# Patient Record
Sex: FEMALE | Race: WHITE | NOT HISPANIC OR LATINO | Employment: PART TIME | URBAN - METROPOLITAN AREA
[De-identification: names, ages, dates, MRNs, and addresses within clinical notes are randomized per-mention and may not be internally consistent; named-entity substitution may affect disease eponyms.]

---

## 2018-01-25 ENCOUNTER — TELEPHONE (OUTPATIENT)
Dept: URGENT CARE | Facility: CLINIC | Age: 53
End: 2018-01-25

## 2018-01-25 ENCOUNTER — OFFICE VISIT (OUTPATIENT)
Dept: ORTHOPEDIC SURGERY | Facility: CLINIC | Age: 53
End: 2018-01-25

## 2018-01-25 VITALS
DIASTOLIC BLOOD PRESSURE: 78 MMHG | BODY MASS INDEX: 28.31 KG/M2 | WEIGHT: 176.15 LBS | HEART RATE: 87 BPM | HEIGHT: 66 IN | SYSTOLIC BLOOD PRESSURE: 125 MMHG

## 2018-01-25 DIAGNOSIS — M89.8X1 PAIN OF RIGHT SCAPULA: Primary | ICD-10-CM

## 2018-01-25 DIAGNOSIS — M54.2 NECK PAIN: ICD-10-CM

## 2018-01-25 DIAGNOSIS — M25.511 RIGHT SHOULDER PAIN, UNSPECIFIED CHRONICITY: ICD-10-CM

## 2018-01-25 PROCEDURE — 99203 OFFICE O/P NEW LOW 30 MIN: CPT | Performed by: PHYSICIAN ASSISTANT

## 2018-01-25 RX ORDER — LISDEXAMFETAMINE DIMESYLATE 70 MG/1
CAPSULE ORAL
Refills: 0 | COMMUNITY
Start: 2017-11-10

## 2018-01-25 NOTE — PROGRESS NOTES
[facility] [place] [facility] Note    Subjective     Pain of the Right Shoulder      HPI    Heather Tipton is a 52 y.o. female. Patient is right hand dominant. Her referral was for right shoulder pain however she localizes the pain the the right medial scapula border with intermittent neck stiffness. No specific pain to the shoulder joint itself. She noted similar pain in October 2017 believed to be caused by an extended drive and overuse. That episode took 2-3 weeks to resolve without any specific treatment. This new episode began 6 days ago. No specific injury or trauma; although, her job requires sitting a computer. The pain is described as consistent, persistent, aching and stabbing. She has found no position that relieves the pain. She recently was evaluated at a [facility] and prescribed naproxen (taking) and metaxalone (not filled). She also reports taking Advil and acetaminophen intermittently. She denies fever, chills or night sweats. She reports numbness and tingling into her right hand/all digits, except for the small finger. No reported loss of strength or function. She is here today with her .    Past Medical History:   Diagnosis Date   • ADHD (attention deficit hyperactivity disorder)       History reviewed. No pertinent surgical history.   Family History   Problem Relation Age of Onset   • Cancer Father    • Heart attack Father      Social History     Social History   • Marital status:      Spouse name: N/A   • Number of children: N/A   • Years of education: N/A     Occupational History   • Not on file.     Social History Main Topics   • Smoking status: Former Smoker     Types: Cigarettes   • Smokeless tobacco: Never Used   • Alcohol use Defer   • Drug use: Defer   • Sexual activity: Defer     Other Topics Concern   • Not on file     Social History Narrative      Current Outpatient Prescriptions on File Prior to Visit   Medication Sig Dispense Refill   • clindamycin (CLEOCIN) 300 MG  "capsule Take 1 capsule by mouth 3 (three) times a day. 30 capsule 0   • metaxalone (SKELAXIN) 800 MG tablet Take 1 tablet by mouth 3 (Three) Times a Day As Needed for Muscle Spasms for up to 21 days. 21 tablet 0   • naproxen (NAPROSYN) 500 MG tablet Take 1 tablet by mouth 2 (Two) Times a Day With Meals for 7 days. 14 tablet 0     No current facility-administered medications on file prior to visit.       No Known Allergies     The following portions of the patient's history were reviewed and updated as appropriate: allergies, current medications, past family history, past medical history, past social history, past surgical history and problem list.    Review of Systems   Constitutional: Negative.    HENT: Negative.    Eyes: Negative.    Respiratory: Negative.    Cardiovascular: Negative.    Gastrointestinal: Negative.    Endocrine: Negative.    Genitourinary: Negative.    Musculoskeletal: Positive for arthralgias and neck stiffness.   Skin: Negative.    Allergic/Immunologic: Negative.    Neurological: Negative.    Hematological: Negative.    Psychiatric/Behavioral: Negative.         Objective      Physical Exam  /78  Pulse 87  Ht 168 cm (66.14\")  Wt 79.9 kg (176 lb 2.4 oz)  BMI 28.31 kg/m2    Body mass index is 28.31 kg/(m^2).    General  Mental Status - alert  General Appearance - cooperative, well groomed, not in acute distress  Orientation - Oriented X3  Build & Nutrition - well developed and well nourished  Posture - normal posture  Gait - normal gait     Integumentary  Global Assessment  Examination of related systems reveals - no lymphadenopathy  General Characteristics  Overall examination of the patient's skin reveals - no rashes, no evidence of scars, no suspicious lesions and no bruises.  Color - normal coloration of skin.    Ortho Exam  C-spine: skin intact without redness, warmth, swelling, lesions. No tenderness along spinous processes. Full cervical motion. Spurling's test with pain into right " arm.     Right Shoulder  Skin: intact without redness, warmth or swelling  TTP: Only noted upper medial scapular border, no spasm/knots/massess noted. None throughout shoulder complex.  ROM: Full motion throughout shoulder with pulling sensation/pain medial border scapula.   Impingement: Neer and Hampton negative  Rotator cuff: Empty can, Drop arm, Lift-off/modified lift-off negative.  Labrum: Gay negative  Bicep: Speeds negative  ACJ: Adduction cross body negative  Strength: 5/5 cuff    Right Elbow/ wrist  Skin negative. FROM. Elbow flexors/extensors 5/5. Wrist flexors/extensors 5/5. Intrinsics 5/5.    Motor: intact Ax/MSC/R/U/M/AIN/PIN  Sensory: intact Ax/MSC/R/U/M  Reflexes: Triceps/Biceps/BR 2+    2+ radial pulse with brisk capillary refill into each digit    Left Shoulder  Skin: normal  No tenderness to palpation  FROM    Imaging/Studies  Plain imaging today of C-spine (3view) and shoulder (3 view) show no acute bony abnormality, fracture, dislocation or lesion. Imaging discussed and reviewed with Dr. Terrell.    Assessment:  1. Pain of right scapula    2. Neck pain    3. Right shoulder pain, unspecified chronicity        Plan:  1. Discussion was had with patient and her  regarding exam, assessment and treatment options. At this time I recommend PT and Pain Management. This could be an inflamed trigger point however with the described radicular symptoms further evaluation of her c-spine may be needed if PT and/or Pain Management are of no benefit.  2. Medication education was provided regarding NSAID usage and related side effects.  3. Recommend proceeding with filling muscle relaxant prescription.  4. Discussed home treatments: ice, heat, stretching and possible massage.  5. F/u with orthopedic surgery clinic as needed.  6. Patient denies having a Primary Care Provider, recommended that she establish on to assist with medical management of any ongoing issues.      Medical Decision Making  Management  Options : prescription/IM medicine and physical/occupational therapy  Data/Risk: radiology tests    Antonina Hardy PA-C  01/25/18  12:42 PM

## 2021-03-29 ENCOUNTER — IMMUNIZATION (OUTPATIENT)
Dept: VACCINE CLINIC | Facility: HOSPITAL | Age: 56
End: 2021-03-29

## 2021-03-29 PROCEDURE — 0001A: CPT | Performed by: INTERNAL MEDICINE

## 2021-03-29 PROCEDURE — 91300 HC SARSCOV02 VAC 30MCG/0.3ML IM: CPT | Performed by: INTERNAL MEDICINE

## 2021-04-19 ENCOUNTER — IMMUNIZATION (OUTPATIENT)
Dept: VACCINE CLINIC | Facility: HOSPITAL | Age: 56
End: 2021-04-19

## 2021-04-19 PROCEDURE — 0002A: CPT | Performed by: INTERNAL MEDICINE

## 2021-04-19 PROCEDURE — 91300 HC SARSCOV02 VAC 30MCG/0.3ML IM: CPT | Performed by: INTERNAL MEDICINE

## 2022-05-16 RX ORDER — SODIUM, POTASSIUM,MAG SULFATES 17.5-3.13G
SOLUTION, RECONSTITUTED, ORAL ORAL
Qty: 354 ML | Refills: 0 | Status: SHIPPED | OUTPATIENT
Start: 2022-05-16

## 2022-06-01 ENCOUNTER — OUTSIDE FACILITY SERVICE (OUTPATIENT)
Dept: GASTROENTEROLOGY | Facility: CLINIC | Age: 57
End: 2022-06-01

## 2022-06-01 PROCEDURE — 45385 COLONOSCOPY W/LESION REMOVAL: CPT | Performed by: INTERNAL MEDICINE

## 2022-06-01 PROCEDURE — 88305 TISSUE EXAM BY PATHOLOGIST: CPT | Performed by: INTERNAL MEDICINE

## 2022-06-02 ENCOUNTER — LAB REQUISITION (OUTPATIENT)
Dept: LAB | Facility: HOSPITAL | Age: 57
End: 2022-06-02

## 2022-06-02 DIAGNOSIS — Z83.71 FAMILY HISTORY OF COLONIC POLYPS: ICD-10-CM

## 2022-06-02 DIAGNOSIS — Z12.11 ENCOUNTER FOR SCREENING FOR MALIGNANT NEOPLASM OF COLON: ICD-10-CM

## 2022-06-02 DIAGNOSIS — Z80.0 FAMILY HISTORY OF MALIGNANT NEOPLASM OF DIGESTIVE ORGANS: ICD-10-CM

## 2022-06-02 DIAGNOSIS — D12.5 BENIGN NEOPLASM OF SIGMOID COLON: ICD-10-CM

## 2022-06-02 DIAGNOSIS — D12.8 BENIGN NEOPLASM OF RECTUM: ICD-10-CM

## 2022-06-02 DIAGNOSIS — K57.30 DIVERTICULOSIS OF LARGE INTESTINE WITHOUT PERFORATION OR ABSCESS WITHOUT BLEEDING: ICD-10-CM

## 2022-06-02 DIAGNOSIS — D12.3 BENIGN NEOPLASM OF TRANSVERSE COLON: ICD-10-CM

## 2022-06-03 LAB
CYTO UR: NORMAL
LAB AP CASE REPORT: NORMAL
LAB AP CLINICAL INFORMATION: NORMAL
PATH REPORT.FINAL DX SPEC: NORMAL
PATH REPORT.GROSS SPEC: NORMAL

## 2025-05-21 RX ORDER — SODIUM, POTASSIUM,MAG SULFATES 17.5-3.13G
SOLUTION, RECONSTITUTED, ORAL ORAL
Qty: 354 ML | Refills: 0 | Status: SHIPPED | OUTPATIENT
Start: 2025-05-21

## 2025-06-02 ENCOUNTER — OUTSIDE FACILITY SERVICE (OUTPATIENT)
Dept: GASTROENTEROLOGY | Facility: CLINIC | Age: 60
End: 2025-06-02
Payer: COMMERCIAL

## 2025-06-02 ENCOUNTER — LAB REQUISITION (OUTPATIENT)
Dept: LAB | Facility: HOSPITAL | Age: 60
End: 2025-06-02
Payer: MEDICARE

## 2025-06-02 DIAGNOSIS — Z83.719 FAMILY HISTORY OF COLON POLYPS, UNSPECIFIED: ICD-10-CM

## 2025-06-02 DIAGNOSIS — K44.9 DIAPHRAGMATIC HERNIA WITHOUT OBSTRUCTION OR GANGRENE: ICD-10-CM

## 2025-06-02 DIAGNOSIS — K22.89 OTHER SPECIFIED DISEASE OF ESOPHAGUS: ICD-10-CM

## 2025-06-02 DIAGNOSIS — Z86.0100 PERSONAL HISTORY OF COLON POLYPS, UNSPECIFIED: ICD-10-CM

## 2025-06-02 DIAGNOSIS — D12.5 BENIGN NEOPLASM OF SIGMOID COLON: ICD-10-CM

## 2025-06-02 DIAGNOSIS — R11.0 NAUSEA: ICD-10-CM

## 2025-06-02 DIAGNOSIS — K29.70 GASTRITIS, UNSPECIFIED, WITHOUT BLEEDING: ICD-10-CM

## 2025-06-02 DIAGNOSIS — R63.4 ABNORMAL WEIGHT LOSS: ICD-10-CM

## 2025-06-02 DIAGNOSIS — K57.30 DIVERTICULOSIS OF LARGE INTESTINE WITHOUT PERFORATION OR ABSCESS WITHOUT BLEEDING: ICD-10-CM

## 2025-06-02 DIAGNOSIS — D12.3 BENIGN NEOPLASM OF TRANSVERSE COLON: ICD-10-CM

## 2025-06-02 DIAGNOSIS — Z80.0 FAMILY HISTORY OF MALIGNANT NEOPLASM OF DIGESTIVE ORGANS: ICD-10-CM

## 2025-06-02 DIAGNOSIS — K63.89 OTHER SPECIFIED DISEASES OF INTESTINE: ICD-10-CM

## 2025-06-02 PROCEDURE — 88305 TISSUE EXAM BY PATHOLOGIST: CPT | Performed by: INTERNAL MEDICINE

## 2025-06-03 LAB — REF LAB TEST METHOD: NORMAL
